# Patient Record
Sex: FEMALE | Race: WHITE | Employment: OTHER | URBAN - METROPOLITAN AREA
[De-identification: names, ages, dates, MRNs, and addresses within clinical notes are randomized per-mention and may not be internally consistent; named-entity substitution may affect disease eponyms.]

---

## 2018-10-23 ENCOUNTER — MEDICAL CORRESPONDENCE (OUTPATIENT)
Dept: HEALTH INFORMATION MANAGEMENT | Facility: CLINIC | Age: 43
End: 2018-10-23

## 2019-01-13 ENCOUNTER — MEDICAL CORRESPONDENCE (OUTPATIENT)
Dept: HEALTH INFORMATION MANAGEMENT | Facility: CLINIC | Age: 44
End: 2019-01-13

## 2019-03-07 ENCOUNTER — MEDICAL CORRESPONDENCE (OUTPATIENT)
Dept: HEALTH INFORMATION MANAGEMENT | Facility: CLINIC | Age: 44
End: 2019-03-07

## 2019-07-11 ENCOUNTER — TRANSFERRED RECORDS (OUTPATIENT)
Dept: HEALTH INFORMATION MANAGEMENT | Facility: CLINIC | Age: 44
End: 2019-07-11

## 2019-09-18 ENCOUNTER — MEDICAL CORRESPONDENCE (OUTPATIENT)
Dept: HEALTH INFORMATION MANAGEMENT | Facility: CLINIC | Age: 44
End: 2019-09-18

## 2019-09-20 ENCOUNTER — OFFICE VISIT (OUTPATIENT)
Dept: PSYCHIATRY | Facility: CLINIC | Age: 44
End: 2019-09-20
Payer: COMMERCIAL

## 2019-09-20 VITALS
RESPIRATION RATE: 19 BRPM | WEIGHT: 190 LBS | DIASTOLIC BLOOD PRESSURE: 87 MMHG | SYSTOLIC BLOOD PRESSURE: 144 MMHG | HEART RATE: 66 BPM

## 2019-09-20 DIAGNOSIS — F32.89 OTHER DEPRESSION: Primary | ICD-10-CM

## 2019-09-20 SDOH — HEALTH STABILITY: MENTAL HEALTH: HOW OFTEN DO YOU HAVE A DRINK CONTAINING ALCOHOL?: NEVER

## 2019-09-20 ASSESSMENT — ANXIETY QUESTIONNAIRES
GAD7 TOTAL SCORE: 18
1. FEELING NERVOUS, ANXIOUS, OR ON EDGE: NEARLY EVERY DAY
2. NOT BEING ABLE TO STOP OR CONTROL WORRYING: NEARLY EVERY DAY
5. BEING SO RESTLESS THAT IT IS HARD TO SIT STILL: SEVERAL DAYS
6. BECOMING EASILY ANNOYED OR IRRITABLE: NEARLY EVERY DAY
3. WORRYING TOO MUCH ABOUT DIFFERENT THINGS: NEARLY EVERY DAY
4. TROUBLE RELAXING: MORE THAN HALF THE DAYS
7. FEELING AFRAID AS IF SOMETHING AWFUL MIGHT HAPPEN: NEARLY EVERY DAY

## 2019-09-20 ASSESSMENT — PATIENT HEALTH QUESTIONNAIRE - PHQ9: SUM OF ALL RESPONSES TO PHQ QUESTIONS 1-9: 24

## 2019-09-20 ASSESSMENT — PAIN SCALES - GENERAL: PAINLEVEL: MODERATE PAIN (4)

## 2019-09-20 NOTE — PATIENT INSTRUCTIONS
I will begin the process of authorization for TMS. This has two steps: getting approval from insurance and being on the wait list for TMS in our clinic.  The first step is two weeks in the simple case. If your insurance denies and we have to appeal, it could take months.  The second is waiting for an open treatment slot. When we have one, someone will call you. If you cannot take that specific time, you will go back on the top of the waiting list.    It can take several weeks on the waiting list when our service is busy. You are always welcome to call or send a MyChart to ask about status updates.  The waiting process can be long and frustrating when you have this severe a symptom level. IF YOU ARE GETTING WORSE, CALL A CRISIS NUMBER (below) OR GO TO AN EMERGENCY ROOM. TMS is not an emergency treatment, and the first priority is keeping you stable while we are getting set up.          CRISIS GENERAL NUMBERS   [phone] ([phone])  OR  911     CRISIS INTERVENTION TEAM (CIT) - this is a POLICE UNIT, specially trained.  This website has information for all of [place]'s CITs. Lays out which areas have this team.  [email or web address]  However, one can just call 911 and ask for this special team.   If police need to be called, DO ask for this team.    CRISIS MOBILE TEAMS  [and see end of this phrase*]  [facility] [place]: 24hrs/7days:    [phone]  (Adults > 17yo)    [phone]  (Child   < 18yo)                                       FRONT DOOR (during the day could call)   [phone]    [facility] -[phone] (Adult)  [phone] (Child)     [facility] -[phone] (Adult and Child)     [facility] [place] -[phone] ([employer] mobile crisis team; Adult and Child; 24hr)     [facility] -[phone] (Adult and Child)     CRISIS HOUSING  [address]               "[phone]  [facility]                                [address]                       [phone]  [facility]                               [address]     [phone]   [facility]  [address]                [phone]    [place] EMERGENCY NUMBERS      Crisis Connection:                                [phone]    [facility]:     [phone]    Crisis Intervention:                                [phone] or [phone]     COPE: Mobile Team 24hrs/7days:    [phone]  (Adults > 17yo)                                                                            [phone]  (Child   < 16yo)  [facility]        [phone] 24/7 line and Mobile Team   [address] [place] [address]  DROP IN:  M-F: 8:00 am - 7:00 pm  Sat: 11:00 am - 3:00 pm   Sun: Closed     WALK-IN COUNSELING:  [facility]       [phone]    [address]:   M, W, F:  1:00-3:00PM    M-Th:  6:30-8:30PM    TRANS and LGBT  Call [employer] at [phone]. This service is staffed by trans people 24/7.   LGBT youth <24 contemplating suicide, call the [employer] [phone].    POISON CONTROL CENTER  [phone]    OR  go to nearest ER    CHILD  \"[facility] has a needs assessment team who will do an intake evaluation. Based on the results of the intake they will recommended inpatient admission, partial hospitalization, intensive outpatient or outpatient care. The number is [phone]. \"    CRISIS TEXT LINE  Http://www.crisistextline.org  FREE SUPPORT AT YOUR FINGERTIPS, 24/7  Crisis Text Line serves anyone, in any type of crisis, providing access to free, 24/7 support and information via the medium people already use and trust: text. Here s how it works:  1)  Text 812439 from anywhere in the [place], " "anytime, about any type of crisis.  2)  A live, trained Crisis Counselor receives the text and responds quickly.      The volunteer Crisis Counselor will help you move from a 'hot moment to a cool moment'    [facility] EMERGENCY NUMBERS      Crisis Connection:                                [phone]    [facility]:              [phone]    Crisis Intervention:                                [phone] or [phone]     COPE: Mobile Team 24hrs/7days:    [phone]  (Adults > 19yo)                                                                            [phone]  (Child   < 18yo)  [facility]        [phone]  CALL 24 hours a day.  [address] [place] [address]  DROP IN:  M-F: 8:00 am - 7:00 pm  Sat: 11:00 am - 3:00 pm   Sun: Closed    WALK-IN COUNSELIN)  [facility]                                  [phone]        [place], [address] [place] [address] Ave:                  M, W:      5:00-7:00PM       2)  [facility]                            [phone]        [place], [address] [facility]                T, Th:      6:00-8:00PM    TRANS and LGBT  Call "[employer] at [phone]. This service is staffed by trans people .   LGBT youth <24 contemplating suicide, call the [employer] [phone].    POISON CONTROL CENTER  [phone]    OR  go to nearest ER    CHILD  \"[facility] has a needs assessment team who will do an intake evaluation. Based on the results of the intake they will recommended inpatient admission, partial hospitalization, intensive outpatient or outpatient care. The number is [phone] or [phone]. \"    CRISIS TEXT LINE  Http://www.crisistextline.org  FREE SUPPORT AT YOUR FINGERTIPS,   Crisis Text Line serves anyone, in any type of crisis, providing access to free,  support and information via the medium people already use and trust: text. Here s how it works:  1)  Text [phone] from " anywhere in the [place], anytime, about any type of crisis.  2)  A live, trained Crisis Counselor receives the text and responds quickly.      The volunteer Crisis Counselor will help you move from a 'hot moment to a cool moment'      * A Community Paramedic (CP) is an advanced paramedic that works to increase access to primary and preventive care and decrease use of emergency departments, which in turn decreases health care costs. Among other things, CPs may play a key role in providing follow-up services after a hospital discharge to prevent hospital readmission. CPs can provide health assessments, chronic disease monitoring and education, medication management, immunizations and vaccinations, laboratory specimen collection, hospital discharge follow-up care and minor medical procedures. CPs work under the direction of an Ambulance Medical Director.
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
## 2019-09-21 ASSESSMENT — ANXIETY QUESTIONNAIRES: GAD7 TOTAL SCORE: 18

## 2019-09-25 ENCOUNTER — MEDICAL CORRESPONDENCE (OUTPATIENT)
Dept: HEALTH INFORMATION MANAGEMENT | Facility: CLINIC | Age: 44
End: 2019-09-25

## 2019-10-24 ENCOUNTER — TELEPHONE (OUTPATIENT)
Dept: PSYCHIATRY | Facility: CLINIC | Age: 44
End: 2019-10-24

## 2019-10-24 NOTE — TELEPHONE ENCOUNTER
-Writer called and left a message explaining the MA situation with TMS.  Left clinic number should she have any questions.

## 2019-10-24 NOTE — TELEPHONE ENCOUNTER
----- Message from Terrie Reardon sent at 10/24/2019 11:13 AM CDT -----  Regarding: Reminder to talk to Shyla about PA  Ph: [phone].  She said you can leave a message.

## 2020-07-02 ENCOUNTER — TRANSFERRED RECORDS (OUTPATIENT)
Dept: HEALTH INFORMATION MANAGEMENT | Facility: CLINIC | Age: 45
End: 2020-07-02

## 2020-07-02 DIAGNOSIS — Z11.59 ENCOUNTER FOR SCREENING FOR OTHER VIRAL DISEASES: Primary | ICD-10-CM

## 2020-07-12 DIAGNOSIS — Z11.59 ENCOUNTER FOR SCREENING FOR OTHER VIRAL DISEASES: ICD-10-CM

## 2020-07-12 PROCEDURE — U0003 INFECTIOUS AGENT DETECTION BY NUCLEIC ACID (DNA OR RNA); SEVERE ACUTE RESPIRATORY SYNDROME CORONAVIRUS 2 (SARS-COV-2) (CORONAVIRUS DISEASE [COVID-19]), AMPLIFIED PROBE TECHNIQUE, MAKING USE OF HIGH THROUGHPUT TECHNOLOGIES AS DESCRIBED BY CMS-2020-01-R: HCPCS | Performed by: PATHOLOGY

## 2020-07-13 LAB
SARS-COV-2 RNA SPEC QL NAA+PROBE: NOT DETECTED
SPECIMEN SOURCE: NORMAL

## 2020-07-14 ENCOUNTER — ANESTHESIA EVENT (OUTPATIENT)
Dept: GASTROENTEROLOGY | Facility: CLINIC | Age: 45
End: 2020-07-14
Payer: COMMERCIAL

## 2020-07-14 ENCOUNTER — HOSPITAL ENCOUNTER (OUTPATIENT)
Facility: CLINIC | Age: 45
Discharge: HOME | End: 2020-07-14
Attending: PATHOLOGY | Admitting: PATHOLOGY
Payer: COMMERCIAL

## 2020-07-14 ENCOUNTER — ANESTHESIA (OUTPATIENT)
Dept: GASTROENTEROLOGY | Facility: CLINIC | Age: 45
End: 2020-07-14
Payer: COMMERCIAL

## 2020-07-14 VITALS — TEMPERATURE: 98.9 F

## 2020-07-14 DIAGNOSIS — D64.9 ANEMIA, UNSPECIFIED TYPE: Primary | ICD-10-CM

## 2020-07-14 PROCEDURE — 40000873 ZZH CANCELLED SURGERY UP TO 15 MINS: Performed by: PATHOLOGY

## 2020-07-14 RX ORDER — LIDOCAINE HYDROCHLORIDE 10 MG/ML
8-10 INJECTION, SOLUTION EPIDURAL; INFILTRATION; INTRACAUDAL; PERINEURAL
Status: DISCONTINUED | OUTPATIENT
Start: 2020-07-14 | End: 2020-07-14 | Stop reason: HOSPADM

## 2020-07-14 RX ORDER — FLUMAZENIL 0.1 MG/ML
0.2 INJECTION, SOLUTION INTRAVENOUS
Status: CANCELLED | OUTPATIENT
Start: 2020-07-14 | End: 2020-07-14

## 2020-07-14 RX ORDER — NALOXONE HYDROCHLORIDE 0.4 MG/ML
.1-.4 INJECTION, SOLUTION INTRAMUSCULAR; INTRAVENOUS; SUBCUTANEOUS
Status: CANCELLED | OUTPATIENT
Start: 2020-07-14 | End: 2020-07-15

## 2020-07-14 NOTE — OR NURSING
Unable to complete bone marrow biopsy today due to NPO status. Procedure rescheduled for 7/15 at 1000.

## 2020-07-15 ENCOUNTER — HOSPITAL ENCOUNTER (OUTPATIENT)
Facility: CLINIC | Age: 45
Discharge: HOME | End: 2020-07-15
Attending: PATHOLOGY | Admitting: PATHOLOGY
Payer: COMMERCIAL

## 2020-07-15 ENCOUNTER — ANESTHESIA (OUTPATIENT)
Dept: GASTROENTEROLOGY | Facility: CLINIC | Age: 45
End: 2020-07-15
Payer: COMMERCIAL

## 2020-07-15 ENCOUNTER — ANESTHESIA EVENT (OUTPATIENT)
Dept: GASTROENTEROLOGY | Facility: CLINIC | Age: 45
End: 2020-07-15
Payer: COMMERCIAL

## 2020-07-15 VITALS
RESPIRATION RATE: 29 BRPM | DIASTOLIC BLOOD PRESSURE: 66 MMHG | TEMPERATURE: 99.1 F | HEART RATE: 81 BPM | OXYGEN SATURATION: 100 % | SYSTOLIC BLOOD PRESSURE: 122 MMHG

## 2020-07-15 LAB
BASOPHILS # BLD AUTO: 0 10E9/L (ref 0–0.2)
BASOPHILS NFR BLD AUTO: 0.6 %
DIFFERENTIAL METHOD BLD: ABNORMAL
EOSINOPHIL # BLD AUTO: 0.1 10E9/L (ref 0–0.7)
EOSINOPHIL NFR BLD AUTO: 2.6 %
ERYTHROCYTE [DISTWIDTH] IN BLOOD BY AUTOMATED COUNT: 12.3 % (ref 10–15)
HCT VFR BLD AUTO: 33.9 % (ref 35–47)
HGB BLD-MCNC: 11.3 G/DL (ref 11.7–15.7)
IMM GRANULOCYTES # BLD: 0 10E9/L (ref 0–0.4)
IMM GRANULOCYTES NFR BLD: 0.2 %
LYMPHOCYTES # BLD AUTO: 1.9 10E9/L (ref 0.8–5.3)
LYMPHOCYTES NFR BLD AUTO: 40.5 %
MCH RBC QN AUTO: 30.2 PG (ref 26.5–33)
MCHC RBC AUTO-ENTMCNC: 33.3 G/DL (ref 31.5–36.5)
MCV RBC AUTO: 91 FL (ref 78–100)
MONOCYTES # BLD AUTO: 0.4 10E9/L (ref 0–1.3)
MONOCYTES NFR BLD AUTO: 8.2 %
NEUTROPHILS # BLD AUTO: 2.2 10E9/L (ref 1.6–8.3)
NEUTROPHILS NFR BLD AUTO: 47.9 %
NRBC # BLD AUTO: 0 10*3/UL
NRBC BLD AUTO-RTO: 0 /100
PLATELET # BLD AUTO: 198 10E9/L (ref 150–450)
RBC # BLD AUTO: 3.74 10E12/L (ref 3.8–5.2)
RETICS # AUTO: 54.2 10E9/L (ref 25–95)
RETICS/RBC NFR AUTO: 1.5 % (ref 0.5–2)
WBC # BLD AUTO: 4.6 10E9/L (ref 4–11)

## 2020-07-15 PROCEDURE — 88237 TISSUE CULTURE BONE MARROW: CPT | Performed by: INTERNAL MEDICINE

## 2020-07-15 PROCEDURE — 85025 COMPLETE CBC W/AUTO DIFF WBC: CPT | Performed by: PATHOLOGY

## 2020-07-15 PROCEDURE — 00000159 ZZHCL STATISTIC H-SEND OUTS PREP: Performed by: INTERNAL MEDICINE

## 2020-07-15 PROCEDURE — 88313 SPECIAL STAINS GROUP 2: CPT | Mod: 26 | Performed by: INTERNAL MEDICINE

## 2020-07-15 PROCEDURE — 00000008 ZZHCL STATISTIC ADDL BM ASP PERF PF 38220: Performed by: INTERNAL MEDICINE

## 2020-07-15 PROCEDURE — 88185 FLOWCYTOMETRY/TC ADD-ON: CPT | Performed by: INTERNAL MEDICINE

## 2020-07-15 PROCEDURE — 38222 DX BONE MARROW BX & ASPIR: CPT | Mod: LT | Performed by: PATHOLOGY

## 2020-07-15 PROCEDURE — 87116 MYCOBACTERIA CULTURE: CPT | Performed by: PATHOLOGY

## 2020-07-15 PROCEDURE — 40000424 ZZHCL STATISTIC BONE MARROW CORE PERF TC 38221: Performed by: INTERNAL MEDICINE

## 2020-07-15 PROCEDURE — 88305 TISSUE EXAM BY PATHOLOGIST: CPT | Performed by: INTERNAL MEDICINE

## 2020-07-15 PROCEDURE — 38221 DX BONE MARROW BIOPSIES: CPT | Performed by: PATHOLOGY

## 2020-07-15 PROCEDURE — 38221 DX BONE MARROW BIOPSIES: CPT | Performed by: INTERNAL MEDICINE

## 2020-07-15 PROCEDURE — 85097 BONE MARROW INTERPRETATION: CPT | Performed by: INTERNAL MEDICINE

## 2020-07-15 PROCEDURE — 40000795 ZZHCL STATISTIC DNA PROCESS AND HOLD: Performed by: PATHOLOGY

## 2020-07-15 PROCEDURE — 37000009 ZZH ANESTHESIA TECHNICAL FEE, EACH ADDTL 15 MIN: Performed by: PATHOLOGY

## 2020-07-15 PROCEDURE — 00000058 ZZHCL STATISTIC BONE MARROW ASP PERF TC 38220: Performed by: INTERNAL MEDICINE

## 2020-07-15 PROCEDURE — 88305 TISSUE EXAM BY PATHOLOGIST: CPT | Mod: 26 | Performed by: INTERNAL MEDICINE

## 2020-07-15 PROCEDURE — 88313 SPECIAL STAINS GROUP 2: CPT | Performed by: INTERNAL MEDICINE

## 2020-07-15 PROCEDURE — 85060 BLOOD SMEAR INTERPRETATION: CPT | Performed by: INTERNAL MEDICINE

## 2020-07-15 PROCEDURE — 25000125 ZZHC RX 250: Performed by: NURSE ANESTHETIST, CERTIFIED REGISTERED

## 2020-07-15 PROCEDURE — 37000008 ZZH ANESTHESIA TECHNICAL FEE, 1ST 30 MIN: Performed by: PATHOLOGY

## 2020-07-15 PROCEDURE — 88280 CHROMOSOME KARYOTYPE STUDY: CPT | Performed by: INTERNAL MEDICINE

## 2020-07-15 PROCEDURE — 38222 DX BONE MARROW BX & ASPIR: CPT | Performed by: INTERNAL MEDICINE

## 2020-07-15 PROCEDURE — 40000847 ZZHCL STATISTIC MORPHOLOGY W/INTERP HISTOLOGY TC 85060: Performed by: INTERNAL MEDICINE

## 2020-07-15 PROCEDURE — 25000128 H RX IP 250 OP 636: Performed by: NURSE ANESTHETIST, CERTIFIED REGISTERED

## 2020-07-15 PROCEDURE — 40000010 ZZH STATISTIC ANES STAT CODE-CRNA PER MINUTE: Performed by: PATHOLOGY

## 2020-07-15 PROCEDURE — 85045 AUTOMATED RETICULOCYTE COUNT: CPT | Performed by: PATHOLOGY

## 2020-07-15 PROCEDURE — 40000948 ZZHCL STATISTIC BONE MARROW ASP TC 85097: Performed by: INTERNAL MEDICINE

## 2020-07-15 PROCEDURE — 36415 COLL VENOUS BLD VENIPUNCTURE: CPT | Performed by: PATHOLOGY

## 2020-07-15 PROCEDURE — 87070 CULTURE OTHR SPECIMN AEROBIC: CPT | Performed by: PATHOLOGY

## 2020-07-15 PROCEDURE — 25800030 ZZH RX IP 258 OP 636: Performed by: NURSE ANESTHETIST, CERTIFIED REGISTERED

## 2020-07-15 PROCEDURE — 88184 FLOWCYTOMETRY/ TC 1 MARKER: CPT | Performed by: INTERNAL MEDICINE

## 2020-07-15 PROCEDURE — 87103 BLOOD FUNGUS CULTURE: CPT | Performed by: PATHOLOGY

## 2020-07-15 PROCEDURE — 88264 CHROMOSOME ANALYSIS 20-25: CPT | Performed by: INTERNAL MEDICINE

## 2020-07-15 PROCEDURE — 88311 DECALCIFY TISSUE: CPT | Mod: 26 | Performed by: INTERNAL MEDICINE

## 2020-07-15 PROCEDURE — 88311 DECALCIFY TISSUE: CPT | Performed by: INTERNAL MEDICINE

## 2020-07-15 PROCEDURE — 40001005 ZZHCL STATISTIC FLOW >15 ABY TC 88189: Performed by: INTERNAL MEDICINE

## 2020-07-15 PROCEDURE — 87075 CULTR BACTERIA EXCEPT BLOOD: CPT | Performed by: PATHOLOGY

## 2020-07-15 RX ORDER — LIDOCAINE HYDROCHLORIDE 20 MG/ML
INJECTION, SOLUTION INFILTRATION; PERINEURAL PRN
Status: DISCONTINUED | OUTPATIENT
Start: 2020-07-15 | End: 2020-07-15

## 2020-07-15 RX ORDER — GABAPENTIN 300 MG/1
300 CAPSULE ORAL DAILY
COMMUNITY

## 2020-07-15 RX ORDER — CARBOXYMETHYLCELLULOSE SODIUM 5 MG/ML
1 SOLUTION/ DROPS OPHTHALMIC 3 TIMES DAILY PRN
COMMUNITY

## 2020-07-15 RX ORDER — CYCLOSPORINE 0.5 MG/ML
1 EMULSION OPHTHALMIC 2 TIMES DAILY
COMMUNITY

## 2020-07-15 RX ORDER — OXYCODONE AND ACETAMINOPHEN 5; 325 MG/1; MG/1
1 TABLET ORAL EVERY 6 HOURS PRN
COMMUNITY

## 2020-07-15 RX ORDER — VENLAFAXINE HYDROCHLORIDE 150 MG/1
150 CAPSULE, EXTENDED RELEASE ORAL DAILY
COMMUNITY

## 2020-07-15 RX ORDER — FENTANYL CITRATE 50 UG/ML
INJECTION, SOLUTION INTRAMUSCULAR; INTRAVENOUS PRN
Status: DISCONTINUED | OUTPATIENT
Start: 2020-07-15 | End: 2020-07-15

## 2020-07-15 RX ORDER — CETIRIZINE HYDROCHLORIDE 10 MG/1
10 TABLET ORAL DAILY
COMMUNITY

## 2020-07-15 RX ORDER — SODIUM CHLORIDE, SODIUM LACTATE, POTASSIUM CHLORIDE, CALCIUM CHLORIDE 600; 310; 30; 20 MG/100ML; MG/100ML; MG/100ML; MG/100ML
INJECTION, SOLUTION INTRAVENOUS CONTINUOUS PRN
Status: DISCONTINUED | OUTPATIENT
Start: 2020-07-15 | End: 2020-07-15

## 2020-07-15 RX ORDER — PROPOFOL 10 MG/ML
INJECTION, EMULSION INTRAVENOUS CONTINUOUS PRN
Status: DISCONTINUED | OUTPATIENT
Start: 2020-07-15 | End: 2020-07-15

## 2020-07-15 RX ORDER — LIDOCAINE HYDROCHLORIDE 10 MG/ML
8-10 INJECTION, SOLUTION EPIDURAL; INFILTRATION; INTRACAUDAL; PERINEURAL
Status: DISCONTINUED | OUTPATIENT
Start: 2020-07-15 | End: 2020-07-15 | Stop reason: HOSPADM

## 2020-07-15 RX ORDER — SUMATRIPTAN SUCCINATE 25 MG/1
25 TABLET ORAL
COMMUNITY

## 2020-07-15 RX ADMIN — FENTANYL CITRATE 50 MCG: 50 INJECTION, SOLUTION INTRAMUSCULAR; INTRAVENOUS at 10:40

## 2020-07-15 RX ADMIN — SODIUM CHLORIDE, POTASSIUM CHLORIDE, SODIUM LACTATE AND CALCIUM CHLORIDE: 600; 310; 30; 20 INJECTION, SOLUTION INTRAVENOUS at 10:40

## 2020-07-15 RX ADMIN — MIDAZOLAM 2 MG: 1 INJECTION INTRAMUSCULAR; INTRAVENOUS at 10:40

## 2020-07-15 RX ADMIN — PROPOFOL 150 MCG/KG/MIN: 10 INJECTION, EMULSION INTRAVENOUS at 10:42

## 2020-07-15 RX ADMIN — LIDOCAINE HYDROCHLORIDE 60 MG: 20 INJECTION, SOLUTION INFILTRATION; PERINEURAL at 10:42

## 2020-07-15 RX ADMIN — FENTANYL CITRATE 50 MCG: 50 INJECTION, SOLUTION INTRAMUSCULAR; INTRAVENOUS at 10:46

## 2020-07-15 ASSESSMENT — ENCOUNTER SYMPTOMS: SEIZURES: 0

## 2020-07-15 ASSESSMENT — LIFESTYLE VARIABLES: TOBACCO_USE: 0

## 2020-07-15 NOTE — OR NURSING
Bone marrow biopsy dressing site CDI. Pt demonstrated understanding of post procedure instructions.

## 2020-07-15 NOTE — OR NURSING
"Spoke with patient regarding doing a pregnancy test, and she has declined to do a pregnancy test.  Stating, \"I haven't had sex in 7 months, and I am in menopause.  I was on HRT, and had a period the last Sunday in June, which was the only period I have had in 2 yrs.\"   "

## 2020-07-15 NOTE — ANESTHESIA PREPROCEDURE EVALUATION
Anesthesia Pre-Procedure Evaluation    Patient: Janneth Isidro   MRN: [id number] : 1975          Preoperative Diagnosis: Normocytic normochromic anemia [D64.9]    Procedure(s):  BIOPSY, BONE MARROW    No past medical history on file.  No past surgical history on file.    Anesthesia Evaluation     . Pt has had prior anesthetic.     No history of anesthetic complications          ROS/MED HX    ENT/Pulmonary:      (-) tobacco use and sleep apnea   Neurologic:      (-) seizures and CVA   Cardiovascular:        (-) hypertension   METS/Exercise Tolerance:     Hematologic:     (+) Anemia, -      Musculoskeletal:         GI/Hepatic:        (-) GERD and liver disease   Renal/Genitourinary:      (-) renal disease   Endo:      (-) Type II DM and thyroid disease   Psychiatric:         Infectious Disease: Comment: Fever of unknown origin         Malignancy:         Other:                          Physical Exam  Normal systems: cardiovascular, pulmonary and dental    Airway   Mallampati: II  TM distance: >3 FB  Neck ROM: full    Dental     Cardiovascular       Pulmonary             No results found for: WBC, HGB, HCT, PLT, CRP, SED, NA, POTASSIUM, CHLORIDE, CO2, BUN, CR, GLC, NETO, PHOS, MAG, ALBUMIN, PROTTOTAL, ALT, AST, GGT, ALKPHOS, BILITOTAL, BILIDIRECT, LIPASE, AMYLASE, ASHUTOSH, PTT, INR, FIBR, TSH, T4, T3, HCG, HCGS, CKTOTAL, CKMB, TROPN    Preop Vitals  BP Readings from Last 3 Encounters:   07/15/20 119/74   19 (!) 144/87    Pulse Readings from Last 3 Encounters:   19 66      Resp Readings from Last 3 Encounters:   07/15/20 12   19 19    SpO2 Readings from Last 3 Encounters:   07/15/20 100%      Temp Readings from Last 1 Encounters:   07/15/20 37.3  C (99.1  F) (Oral)    Ht Readings from Last 1 Encounters:   No data found for Ht      Wt Readings from Last 1 Encounters:   19 86.2 kg (190 lb)    There is no height or weight on file to calculate BMI.       Anesthesia Plan      History & Physical  Review  History and physical reviewed and following examination; no interval change.    ASA Status:  2 .    NPO Status:  > 8 hours    Plan for MAC Reason for MAC:  Deep or markedly invasive procedure (G8)  PONV prophylaxis:  Ondansetron (or other 5HT-3)         Postoperative Care      Consents  Anesthetic plan, risks, benefits and alternatives discussed with:  Patient..                 Jeff Howard MD

## 2020-07-15 NOTE — ANESTHESIA CARE TRANSFER NOTE
Patient: Janneth Isidro    Procedure(s):  BIOPSY, BONE MARROW    Diagnosis: Normocytic normochromic anemia [D64.9]  Diagnosis Additional Information: No value filed.    Anesthesia Type:   MAC     Note:  Airway :Room Air  Patient transferred to:PACU  Comments: To recovery, VSSHandoff Report: Identifed the Patient, Identified the Reponsible Provider, Reviewed the pertinent medical history, Discussed the surgical course, Reviewed Intra-OP anesthesia mangement and issues during anesthesia, Set expectations for post-procedure period and Allowed opportunity for questions and acknowledgement of understanding      Vitals: (Last set prior to Anesthesia Care Transfer)    CRNA VITALS  7/15/2020 1036 - 7/15/2020 1111      7/15/2020             Resp Rate (set):  10                Electronically Signed By: MAN Hearn CRNA  July 15, 2020  11:11 AM

## 2020-07-15 NOTE — ANESTHESIA POSTPROCEDURE EVALUATION
Patient: Janneth Isidro    Procedure(s):  BIOPSY, BONE MARROW    Diagnosis:Normocytic normochromic anemia [D64.9]  Diagnosis Additional Information: No value filed.    Anesthesia Type:  MAC    Note:  Anesthesia Post Evaluation    Patient location during evaluation: PACU  Patient participation: Able to fully participate in evaluation  Level of consciousness: awake and alert  Pain management: satisfactory to patient  Airway patency: patent  Cardiovascular status: hemodynamically stable  Respiratory status: acceptable and unassisted  Hydration status: balanced  PONV: none     Anesthetic complications: None          Last vitals:  Vitals:    07/15/20 1120 07/15/20 1130 07/15/20 1140   BP: 116/69 112/81 122/66   Pulse: 68 76 81   Resp: 24 (!) 43 29   Temp:      SpO2: 99% 97% 100%         Electronically Signed By: Jeff Howard MD  July 15, 2020  12:46 PM

## 2020-07-15 NOTE — PROCEDURES
The patient was positively identified and informed consent was obtained (see the completed Affirmation of Consent for Bone Marrow Aspiration and/or Biopsy Procedure(s) form in the patient's chart). The patient was placed in the prone position and the bony landmarks of the pelvis were identified. Medical staff reconfirmed the patient's name, date of birth and procedure. The skin over the posterior iliac crest was scrubbed and draped in a sterile fashion. The local area of the procedure was anesthetized with a total of 10 mL of 1% Lidocaine and a small incision was made.  The patient did receive conscious sedation.    Trephine bone marrow core(s) was/were obtained from the left posterior iliac crest. Bone marrow aspirate was obtained from the left posterior iliac crest for: morphology with possible immunophenotyping, cytogenetics, molecular diagnostics (process and hold), and cultures (aerobic, anaerobic, fungal, and mycobacteria).    Direct pressure was applied to the biopsy site with sterile gauze. The biopsy site was cleaned with alcohol and a sterile dressing was placed over the biopsy incision using a pressure bandage. The patient was then placed in the supine position to maintain pressure on the biopsy site. Post-procedure wound care instructions, including routine dressing instructions and analgesia, were given to the patient. The procedure was completed without complication.

## 2020-07-16 LAB
COPATH REPORT: NORMAL
FUNGUS SPEC CULT: NORMAL
Lab: NORMAL
SPECIMEN SOURCE: NORMAL

## 2020-07-17 LAB
COPATH REPORT: NORMAL
COPATH REPORT: NORMAL

## 2020-07-21 LAB
BACTERIA SPEC CULT: NO GROWTH
Lab: NORMAL
SPECIMEN SOURCE: NORMAL

## 2020-07-22 LAB
BACTERIA SPEC CULT: NORMAL
Lab: NORMAL
SPECIMEN SOURCE: NORMAL

## 2020-07-27 LAB — COPATH REPORT: NORMAL

## 2020-08-12 LAB
BACTERIA SPEC CULT: NORMAL
Lab: NORMAL
SPECIMEN SOURCE: NORMAL

## 2020-08-26 LAB
Lab: NORMAL
MYCOBACTERIUM SPEC CULT: NORMAL
SPECIMEN SOURCE: NORMAL

## 2021-08-16 ENCOUNTER — TELEPHONE (OUTPATIENT)
Dept: PSYCHIATRY | Facility: CLINIC | Age: 46
End: 2021-08-16

## 2021-08-17 NOTE — TELEPHONE ENCOUNTER
Writer returned call to patient.  Received voicemail.  Left message asking for a return call.  Clinic number provided.

## 2021-08-19 NOTE — TELEPHONE ENCOUNTER
Writer attempted to call patient again.  Received voicemail.  Left message asking for a return call.  Clinic number provided.

## 2021-08-20 NOTE — TELEPHONE ENCOUNTER
Writer attempted to call for the 3rd and final time.  Received voicemail.  Left message with clinic number should patient want to return call.

## (undated) DEVICE — SPECIMEN CONTAINER 60MLW/10% FORMALIN 59601

## (undated) DEVICE — PEN MARKING SKIN

## (undated) RX ORDER — FENTANYL CITRATE 50 UG/ML
INJECTION, SOLUTION INTRAMUSCULAR; INTRAVENOUS
Status: DISPENSED
Start: 2020-07-15